# Patient Record
(demographics unavailable — no encounter records)

---

## 2025-06-12 NOTE — PLAN
[FreeTextEntry1] : 35 y/o P2 LMP 5/30/25 for well woman visit  Plan: - Pap test up to date - continue PNV - RTO for annual or PRN

## 2025-06-12 NOTE — PHYSICAL EXAM
[MA] : MA [FreeTextEntry2] : Heena [Appropriately responsive] : appropriately responsive [Alert] : alert [No Acute Distress] : no acute distress [Regular Rate Rhythm] : regular rate rhythm [Soft] : soft [Non-tender] : non-tender [Non-distended] : non-distended [No Mass] : no mass [Oriented x3] : oriented x3 [FreeTextEntry5] : non labored breathing  [Examination Of The Breasts] : a normal appearance [No Masses] : no breast masses were palpable [Labia Majora] : normal [Labia Minora] : normal [Normal] : normal [Uterine Adnexae] : normal

## 2025-06-12 NOTE — HISTORY OF PRESENT ILLNESS
[FreeTextEntry1] : 33 y/o P2 LMP 5/30/25 presents for well woman visit Recently stopped OCPs as she would like to start family planning. Has been taking PNV Otherwise feels well with no concerns, no changes in med/surgical history since last visit